# Patient Record
Sex: FEMALE | Race: WHITE | NOT HISPANIC OR LATINO | Employment: PART TIME | URBAN - METROPOLITAN AREA
[De-identification: names, ages, dates, MRNs, and addresses within clinical notes are randomized per-mention and may not be internally consistent; named-entity substitution may affect disease eponyms.]

---

## 2022-09-20 ENCOUNTER — CLINICAL SUPPORT (OUTPATIENT)
Dept: URGENT CARE | Facility: CLINIC | Age: 32
End: 2022-09-20

## 2022-09-20 DIAGNOSIS — Z11.1 SCREENING-PULMONARY TB: Primary | ICD-10-CM

## 2022-09-20 PROCEDURE — 86580 POCT TB SKIN TEST: ICD-10-PCS | Mod: ,,, | Performed by: FAMILY MEDICINE

## 2022-09-20 PROCEDURE — 86580 TB INTRADERMAL TEST: CPT | Mod: ,,, | Performed by: FAMILY MEDICINE

## 2022-09-20 NOTE — PROGRESS NOTES
Pt presents to clinic requesting TB skin test. Pt reviewed and signed consent form. TB skin test administered on right forearm. Pt tolerated well. Pt given instruction to return to clinic in 48-72 hours to have results read. Pt verbalized understanding.

## 2022-09-20 NOTE — LETTER
September 22, 2022    Maris Polanco  [address] [place] [address]             [facility]  Urgent Care  [address] [place] [address]  Phone: [phone]   Dear Mrs. Maris Polanco:    Below are the results from your recent visit:    PPD Test Value         9/22/2022         Tb Skin Test Negative    TB Induration(mm) 0            Sincerely,      Princess Cortez MA

## 2022-09-22 LAB
TB INDURATION - 48 HR READ: 0 MM
TB INDURATION - 72 HR READ: NORMAL
TB SKIN TEST - 48 HR READ: NEGATIVE
TB SKIN TEST - 72 HR READ: NORMAL

## 2022-09-22 NOTE — PROGRESS NOTES
PPD Reading Note  PPD read and results entered in [employer].  Result: 0 mm induration.  Interpretation: NEGATIVE  If test not read within 48-72 hours of initial placement, patient advised to repeat in other arm 1-3 weeks after this test.  Allergic reaction: no

## 2023-10-10 ENCOUNTER — CLINICAL SUPPORT (OUTPATIENT)
Dept: URGENT CARE | Facility: CLINIC | Age: 33
End: 2023-10-10

## 2023-10-10 DIAGNOSIS — Z11.1 PPD SCREENING TEST: Primary | ICD-10-CM

## 2023-10-10 PROCEDURE — 86580 POCT TB SKIN TEST: ICD-10-PCS | Mod: ,,, | Performed by: PHYSICIAN ASSISTANT

## 2023-10-10 PROCEDURE — 86580 TB INTRADERMAL TEST: CPT | Mod: ,,, | Performed by: PHYSICIAN ASSISTANT

## 2023-10-10 NOTE — PROGRESS NOTES
PPD Placement note  Maris Polanco, 33 y.o. female is here today for placement of PPD test  Reason for PPD test: ROUTINE  Pt taken PPD test before: yes  Verified in allergy area and with patient that they are not allergic to the products PPD is made of (Phenol or Tween). Yes  Is patient taking any oral or IV steroid medication now or have they taken it in the last month? no  Has the patient ever received the BCG vaccine?: no  Has the patient been in recent contact with anyone known or suspected of having active TB disease?: no       Date of exposure (if applicable): N/A       Name of person they were exposed to (if applicable): N/A  Patient's [place] of origin[place]  O: Alert and oriented in NAD.  P:  PPD placed on 10/10/2023.  Patient advised to return for reading within 48-72 hours.

## 2023-10-16 ENCOUNTER — CLINICAL SUPPORT (OUTPATIENT)
Dept: URGENT CARE | Facility: CLINIC | Age: 33
End: 2023-10-16

## 2023-10-16 DIAGNOSIS — Z11.1 ENCOUNTER FOR TUBERCULIN SKIN TEST: Primary | ICD-10-CM

## 2023-10-16 PROCEDURE — 86580 POCT TB SKIN TEST: ICD-10-PCS | Mod: ,,, | Performed by: FAMILY MEDICINE

## 2023-10-16 PROCEDURE — 86580 TB INTRADERMAL TEST: CPT | Mod: ,,, | Performed by: FAMILY MEDICINE

## 2023-10-16 NOTE — PROGRESS NOTES
Subjective:      Patient ID: Maris Polanco is a 33 y.o. female.    Vitals:  vitals were not taken for this visit.     Chief Complaint: No chief complaint on file.    Maris Polanco, 33 y.o. female is here today for placement of PPD test  Reason for PPD test: Routine  Pt taken PPD test before: yes  Verified in allergy area and with patient that they are not allergic to the products PPD is made of (Phenol or Tween). Yes  Is patient taking any oral or IV steroid medication now or have they taken it in the last month? no  Has the patient ever received the BCG vaccine?: no  PPD placed on 10/16/2023.  Patient advised to return for reading within 48-72 hours, verbalized understanding.   ROS   Objective:     Physical Exam    Assessment:     1. Encounter for tuberculin skin test        Plan:       Encounter for tuberculin skin test  -     POCT TB Skin Test Read

## 2023-10-18 ENCOUNTER — CLINICAL SUPPORT (OUTPATIENT)
Dept: URGENT CARE | Facility: CLINIC | Age: 33
End: 2023-10-18

## 2023-10-18 DIAGNOSIS — Z11.1 ENCOUNTER FOR PPD SKIN TEST READING: Primary | ICD-10-CM

## 2023-10-18 NOTE — LETTER
October 18, 2023    Maris Polanco  [address] [place] [address]             [facility]  Urgent Care  [address] [place] [address]  Phone: [phone]   Dear Mrs. Maris Realmiriamdavid:    Below are the results from your recent visit:  PPD Reading Note  PPD read and results entered in [employer].  Result: 0 mm induration.  Interpretation: NEGATIVE  If test not read within 48-72 hours of initial placement, patient advised to repeat in other arm 1-3 weeks after this test.  Allergic reaction: no     Sincerely,        Princess Cortez MA

## 2023-10-18 NOTE — PROGRESS NOTES
PPD Reading Note  PPD read and results entered in [employer].  Result: 0 mm induration.  Interpretation: NEGATIVE  If test not read within 48-72 hours of initial placement, patient advised to repeat in other arm 1-3 weeks after this test.  Allergic reaction: no

## 2025-07-09 ENCOUNTER — HOSPITAL ENCOUNTER (EMERGENCY)
Facility: HOSPITAL | Age: 35
Discharge: HOME | End: 2025-07-09
Attending: STUDENT IN AN ORGANIZED HEALTH CARE EDUCATION/TRAINING PROGRAM

## 2025-07-09 VITALS
TEMPERATURE: 98 F | HEART RATE: 51 BPM | SYSTOLIC BLOOD PRESSURE: 117 MMHG | RESPIRATION RATE: 15 BRPM | DIASTOLIC BLOOD PRESSURE: 77 MMHG | OXYGEN SATURATION: 100 % | WEIGHT: 155 LBS | BODY MASS INDEX: 22.19 KG/M2 | HEIGHT: 70 IN

## 2025-07-09 DIAGNOSIS — N39.0 URINARY TRACT INFECTION WITHOUT HEMATURIA, SITE UNSPECIFIED: ICD-10-CM

## 2025-07-09 DIAGNOSIS — R30.0 DYSURIA: Primary | ICD-10-CM

## 2025-07-09 LAB
B-HCG UR QL: NEGATIVE
BACTERIA #/AREA URNS AUTO: ABNORMAL /HPF
BILIRUB UR QL STRIP.AUTO: NEGATIVE
CLARITY UR: CLEAR
COLOR UR AUTO: YELLOW
GLUCOSE UR QL STRIP: NORMAL
HGB UR QL STRIP: NEGATIVE
KETONES UR QL STRIP: NEGATIVE
LEUKOCYTE ESTERASE UR QL STRIP: 25
NITRITE UR QL STRIP: NEGATIVE
PH UR STRIP: 6.5 [PH]
PROT UR QL STRIP: NEGATIVE
RBC #/AREA URNS AUTO: ABNORMAL /HPF
SP GR UR STRIP.AUTO: 1 (ref 1–1.03)
SQUAMOUS #/AREA URNS LPF: ABNORMAL /HPF
UROBILINOGEN UR STRIP-ACNC: NORMAL
WBC #/AREA URNS AUTO: ABNORMAL /HPF

## 2025-07-09 PROCEDURE — 99283 EMERGENCY DEPT VISIT LOW MDM: CPT

## 2025-07-09 PROCEDURE — 87086 URINE CULTURE/COLONY COUNT: CPT | Performed by: STUDENT IN AN ORGANIZED HEALTH CARE EDUCATION/TRAINING PROGRAM

## 2025-07-09 PROCEDURE — 81025 URINE PREGNANCY TEST: CPT | Performed by: STUDENT IN AN ORGANIZED HEALTH CARE EDUCATION/TRAINING PROGRAM

## 2025-07-09 PROCEDURE — 81001 URINALYSIS AUTO W/SCOPE: CPT | Performed by: STUDENT IN AN ORGANIZED HEALTH CARE EDUCATION/TRAINING PROGRAM

## 2025-07-09 RX ORDER — LEVOFLOXACIN 500 MG/1
500 TABLET, FILM COATED ORAL DAILY
Qty: 5 TABLET | Refills: 0 | Status: SHIPPED | OUTPATIENT
Start: 2025-07-09 | End: 2025-07-14

## 2025-07-09 NOTE — DISCHARGE INSTRUCTIONS
Follow-up with the primary care physician.      Urine culture will be obtained to determine which organism is causing your symptoms.  Will also test sensitivities.  If not covered by Levaquin you will receive a phone call.      Return to the emergency department for any new or worsening symptoms.

## 2025-07-09 NOTE — ED PROVIDER NOTES
"Encounter Date: 7/9/2025    SCRIBE #1 NOTE: I, Brittny Gomez, am scribing for, and in the presence of,  Berto Patrick MD. I have scribed the following portions of the note - Other sections scribed: HPI, ROS, PE.       History     Chief Complaint   Patient presents with    Dysuria     Patient arrives POV, ambulatory in triage with c/o "kidney infection," x 1 month. Patient has taken Amoxicillin and Bactrim since diagnosed; states symptoms clear but return. Endorses some hematuria and painful urination. VSS; afebrile. Denies N/V/D.     Patient is a 35 year old female presenting to the ED with dysuria for 1 month. Patient states she went to an urgent care when the symptoms first started and got diagnosed with a kidney infection and took amoxicillin with relief. Patient states after the finishing the antibiotics the symptoms reappeared and states she went to another urgent care and was diagnosed with a UTI, and states she recently finished bactrim with relief. Patient states the symptoms have come back again. Patient states she usually get 2 to 3 UTI per year, and states she used to take Macrobib but it started to cause respiratory issues. Patient denies fever, nausea, and vomiting.    The history is provided by the patient and medical records.     Review of patient's allergies indicates:   Allergen Reactions    Macrobid [nitrofurantoin monohyd/m-cryst] Shortness Of Breath     No past medical history on file.  No past surgical history on file.  No family history on file.  Social History[1]  Review of Systems   Constitutional:  Negative for fever.   HENT:  Negative for sore throat.    Eyes:  Negative for visual disturbance.   Respiratory:  Negative for shortness of breath.    Cardiovascular:  Negative for chest pain.   Gastrointestinal:  Negative for abdominal pain, nausea and vomiting.   Genitourinary:  Positive for dysuria and hematuria.   Musculoskeletal:  Negative for joint swelling.   Skin:  Negative for rash. "   Neurological:  Negative for weakness.   Psychiatric/Behavioral:  Negative for confusion.        Physical Exam     Initial Vitals [07/09/25 0837]   BP Pulse Resp Temp SpO2   134/70 84 16 97.9 °F (36.6 °C) 100 %      MAP       --         Physical Exam    Nursing note and vitals reviewed.  Constitutional: She appears well-developed and well-nourished.   HENT:   Head: Normocephalic and atraumatic.   Eyes: EOM are normal. Pupils are equal, round, and reactive to light.   Neck:   Normal range of motion.  Cardiovascular:  Normal rate, regular rhythm, normal heart sounds and intact distal pulses.           No murmur heard.  Pulmonary/Chest: Breath sounds normal. No respiratory distress. She has no wheezes. She has no rales.   Abdominal: Abdomen is soft. She exhibits no distension. There is no abdominal tenderness. There is no rebound.   Musculoskeletal:         General: No tenderness or edema. Normal range of motion.      Cervical back: Normal range of motion.     Neurological: She is alert. She has normal strength. No cranial nerve deficit. GCS score is 15. GCS eye subscore is 4. GCS verbal subscore is 5. GCS motor subscore is 6.   Skin: Skin is warm and dry. Capillary refill takes less than 2 seconds. No rash noted. No erythema.   Psychiatric: She has a normal mood and affect.         ED Course   Procedures  Labs Reviewed   URINALYSIS, REFLEX TO URINE CULTURE - Abnormal       Result Value    Color, UA Yellow      Appearance, UA Clear      Specific Gravity, UA 1.005      pH, UA 6.5      Protein, UA Negative      Glucose, UA Normal      Ketones, UA Negative      Blood, UA Negative      Bilirubin, UA Negative      Urobilinogen, UA Normal      Nitrites, UA Negative      Leukocyte Esterase, UA 25 (*)     RBC, UA 0-5      WBC, UA 11-20 (*)     Bacteria, UA Few (*)     Squamous Epithelial Cells, UA Trace     PREGNANCY TEST, URINE RAPID - Normal    hCG Qualitative, Urine Negative     CULTURE, URINE    Urine Culture No Growth             Imaging Results    None          Medications - No data to display  Medical Decision Making  Judging by the patient's chief complaint and pertinent history, the patient has the following possible differential diagnoses, including but not limited to the following.  Some of these are deemed to be lower likelihood and some more likely based on my physical exam and history combined with possible lab work and/or imaging studies.   Please see the pertinent studies, and refer to the HPI.  Some of these diagnoses will take further evaluation to fully rule out, perhaps as an outpatient and the patient was encouraged to follow up when discharged for more comprehensive evaluation.      UTI, cystitis, pyelonephritis     Patient is a 35-year-old female presents to emergency department for dysuria.  See HPI.  See physical exam.  Urinalysis with few bacteria, 11-20 white blood cells given her symptoms with place patient on Levaquin.  Reassessed patient.  Patient is resting comfortably.  Discussed all results.  Discussed need for follow-up.  Discussed return precautions.  Answered all questions at this time.  Hemodynamically stable for continued outpatient management with strict return precautions.  Patient verbalized understanding agreed to plan.      Problems Addressed:  Dysuria: acute illness or injury  Urinary tract infection without hematuria, site unspecified: acute illness or injury that poses a threat to life or bodily functions    Amount and/or Complexity of Data Reviewed  Labs: ordered. Decision-making details documented in ED Course.    Risk  Prescription drug management.            Scribe Attestation:   Scribe #1: I performed the above scribed service and the documentation accurately describes the services I performed. I attest to the accuracy of the note.    Attending Attestation:           Physician Attestation for Scribe:  Physician Attestation Statement for Scribe #1: I, Berto Patrick MD, reviewed  documentation, as scribed by Brittny Gomez in my presence, and it is both accurate and complete.             ED Course as of 07/15/25 1910   Wed 2025   0940 Bacteria, UA(!): Few [RP]   0940 WBC, UA(!): 11-20 [RP]   0940 Leukocyte Esterase, UA(!): 25 [RP]      ED Course User Index  [RP] Berto Patrick MD                           Clinical Impression:  Final diagnoses:  [R30.0] Dysuria (Primary)  [N39.0] Urinary tract infection without hematuria, site unspecified          ED Disposition Condition    Discharge Stable          ED Prescriptions       Medication Sig Dispense Start Date End Date Auth. Provider    levoFLOXacin (LEVAQUIN) 500 MG tablet () Take 1 tablet (500 mg total) by mouth once daily. for 5 days 5 tablet 2025 Berto Patrick MD          Follow-up Information       Follow up With Specialties Details Why Contact Info    Primary Care  Call in 1 day  Please call [phone] for a primary care provider.                   [1]         Berto Patrick MD  07/15/25 1911

## 2025-07-11 LAB — BACTERIA UR CULT: NO GROWTH
